# Patient Record
Sex: MALE | Race: WHITE | Employment: FULL TIME | URBAN - METROPOLITAN AREA
[De-identification: names, ages, dates, MRNs, and addresses within clinical notes are randomized per-mention and may not be internally consistent; named-entity substitution may affect disease eponyms.]

---

## 2021-04-17 ENCOUNTER — HOSPITAL ENCOUNTER (EMERGENCY)
Facility: CLINIC | Age: 51
Discharge: HOME | End: 2021-04-17
Attending: EMERGENCY MEDICINE | Admitting: EMERGENCY MEDICINE
Payer: COMMERCIAL

## 2021-04-17 ENCOUNTER — APPOINTMENT (OUTPATIENT)
Dept: GENERAL RADIOLOGY | Facility: CLINIC | Age: 51
End: 2021-04-17
Attending: EMERGENCY MEDICINE
Payer: COMMERCIAL

## 2021-04-17 VITALS
DIASTOLIC BLOOD PRESSURE: 112 MMHG | OXYGEN SATURATION: 100 % | SYSTOLIC BLOOD PRESSURE: 176 MMHG | HEART RATE: 119 BPM | TEMPERATURE: 97.9 F | RESPIRATION RATE: 16 BRPM

## 2021-04-17 DIAGNOSIS — S61.317A LACERATION OF LEFT LITTLE FINGER WITHOUT FOREIGN BODY WITH DAMAGE TO NAIL, INITIAL ENCOUNTER: ICD-10-CM

## 2021-04-17 DIAGNOSIS — T14.8XXA OPEN FRACTURE: ICD-10-CM

## 2021-04-17 PROCEDURE — 90715 TDAP VACCINE 7 YRS/> IM: CPT | Performed by: EMERGENCY MEDICINE

## 2021-04-17 PROCEDURE — 96365 THER/PROPH/DIAG IV INF INIT: CPT

## 2021-04-17 PROCEDURE — 250N000011 HC RX IP 250 OP 636: Performed by: EMERGENCY MEDICINE

## 2021-04-17 PROCEDURE — 90471 IMMUNIZATION ADMIN: CPT | Performed by: EMERGENCY MEDICINE

## 2021-04-17 PROCEDURE — 12001 RPR S/N/AX/GEN/TRNK 2.5CM/<: CPT

## 2021-04-17 PROCEDURE — 99284 EMERGENCY DEPT VISIT MOD MDM: CPT | Mod: 25

## 2021-04-17 PROCEDURE — 73140 X-RAY EXAM OF FINGER(S): CPT | Mod: LT

## 2021-04-17 PROCEDURE — 26750 TREAT FINGER FRACTURE EACH: CPT | Mod: T4

## 2021-04-17 RX ORDER — OXYCODONE HYDROCHLORIDE 5 MG/1
5 TABLET ORAL EVERY 6 HOURS PRN
Qty: 12 TABLET | Refills: 0 | Status: SHIPPED | OUTPATIENT
Start: 2021-04-17

## 2021-04-17 RX ORDER — CEPHALEXIN 500 MG/1
500 CAPSULE ORAL 4 TIMES DAILY
Qty: 40 CAPSULE | Refills: 0 | Status: SHIPPED | OUTPATIENT
Start: 2021-04-17 | End: 2021-04-27

## 2021-04-17 RX ORDER — SENNA AND DOCUSATE SODIUM 50; 8.6 MG/1; MG/1
1-2 TABLET, FILM COATED ORAL 2 TIMES DAILY PRN
Qty: 30 TABLET | Refills: 0 | Status: SHIPPED | OUTPATIENT
Start: 2021-04-17 | End: 2021-05-17

## 2021-04-17 RX ORDER — BUPIVACAINE HYDROCHLORIDE 5 MG/ML
INJECTION, SOLUTION PERINEURAL
Status: DISCONTINUED
Start: 2021-04-17 | End: 2021-04-17 | Stop reason: HOSPADM

## 2021-04-17 RX ORDER — CEFAZOLIN SODIUM 2 G/100ML
2 INJECTION, SOLUTION INTRAVENOUS ONCE
Status: COMPLETED | OUTPATIENT
Start: 2021-04-17 | End: 2021-04-17

## 2021-04-17 RX ADMIN — CLOSTRIDIUM TETANI TOXOID ANTIGEN (FORMALDEHYDE INACTIVATED), CORYNEBACTERIUM DIPHTHERIAE TOXOID ANTIGEN (FORMALDEHYDE INACTIVATED), BORDETELLA PERTUSSIS TOXOID ANTIGEN (GLUTARALDEHYDE INACTIVATED), BORDETELLA PERTUSSIS FILAMENTOUS HEMAGGLUTININ ANTIGEN (FORMALDEHYDE INACTIVATED), BORDETELLA PERTUSSIS PERTACTIN ANTIGEN, AND BORDETELLA PERTUSSIS FIMBRIAE 2/3 ANTIGEN 0.5 ML: 5; 2; 2.5; 5; 3; 5 INJECTION, SUSPENSION INTRAMUSCULAR at 13:31

## 2021-04-17 RX ADMIN — CEFAZOLIN SODIUM 2 G: 2 INJECTION, SOLUTION INTRAVENOUS at 14:09

## 2021-04-17 ASSESSMENT — ENCOUNTER SYMPTOMS
WOUND: 1
FEVER: 0
DIARRHEA: 0
COUGH: 0

## 2021-04-17 NOTE — ED PROVIDER NOTES
History   Chief Complaint:  Hand Pain     The history is provided by the patient.      Martell Tyler is a 50 year old, right handed, male who presents with nonradiating severe L 5th finger pain. The patient tells us that about thirty minutes before arrival to the ED he got his left 5th finger caught between a  and a piece of wood. The patient tells us the pain is non radiating. The patient denies fever, cough or diarrhea.     To note, the patients last Tetanus shot was in 2011.     Review of Systems   Constitutional: Negative for fever.   Respiratory: Negative for cough.    Gastrointestinal: Negative for diarrhea.   Skin: Positive for wound (Finger Laceration).   All other systems reviewed and are negative.    Allergies:  The patient has no known allergies.     Medications:  Aleve    Past Medical History:    Pain in thoracic spine  Spasm of back muscles  Cervicalgia     Past Surgical History:    The patient denies past surgical history.      Family History:    The patient denies past family history.     Social History:  The patient presents to the ED with his son.   The patient denies alcohol, tobacco or drug use.     Physical Exam     Patient Vitals for the past 24 hrs:   BP Temp Pulse Resp SpO2   04/17/21 1251 (!) 176/112 -- -- -- --   04/17/21 1249 -- 97.9  F (36.6  C) 119 16 100 %       Physical Exam  Constitutional: Well developed, nontox appearance  Head: Atraumatic.   Neck:  no stridor  Eyes: no scleral icterus  Cardiovascular: Reg tachycardia, 2+ L radial pulse  Pulmonary/Chest: nml resp effort  Ext: Warm, well perfused, no edema   LUE: complex 8 cm laceration to ulnar and palmar side of 5th finger, ROM limited   Neurological: A&O, symmetric facies, moves ext x4  Skin: Skin is warm and dry.   Psychiatric: Behavior is normal. Thought content normal.   Nursing note and vitals reviewed.    Emergency Department Course     Imaging:    XR Finger 2-3 Views Left:  ED read demonstrated comminuted  fracture of L 5th finger distal phalanx fracture     Procedures    Laceration Repair        LACERATION:  A complex clean 8 cm laceration.      LOCATION:  Left fifth finger.       FUNCTION:  Distally sensation, circulation, motor and tendon function are   intact.      ANESTHESIA:  Digital and flexor sheath blocks using Marcaine 0.5% total block 3 mLs      PREPARATION:  Irrigation and Scrubbing with Techni-Care and Shur Clens; betadine       DEBRIDEMENT:  Small amount debridement, wound explored, no foreign   body found.       CLOSURE:  Wound was closed with One Layer.  Skin closed with 19 x 5.0   Ethylon using interrupted sutures.    Emergency Department Course:    Reviewed:  I reviewed nursing notes, vitals, past medical history and care everywhere    Assessments:  1300 I obtained history and examined the patient as noted above.   1313 I rechecked the patient and numbed his wound at this time.   1435 I repaired the patients laceration at this time, see procedural note above.     Interventions:  1331 Tdap 0.5 mg IM  1409 Ancef 200 2 g IV    Disposition:  The patient was discharged to home.       Impression & Plan     Medical Decision Makin year old male presenting w/ L 5th finger laceration    The patient presented with a laceration and xrays demonstrated evidence of open fracture.  Abx ordered as noted above.  The wound was carefully evaluated and explored.  The laceration was closed as noted in the procedure note.  Given complex laceration and open fracture, plan for f/u with orthopedic hand surgery.  At this time I feel the pt is safe for discharge.  Recommendations given regarding follow up with [facility] and return to the emergency department as needed for new or worsening symptoms.  Pt counseled on disposition and diagnosis.  They are understanding and agreeable to plan. Patient discharged in stable condition.       Diagnosis:  No diagnosis found.    Discharge Medications:  Discharge  Medication List as of 4/17/2021  3:52 PM      START taking these medications    Details   cephALEXin (KEFLEX) 500 MG capsule Take 1 capsule (500 mg) by mouth 4 times daily for 10 days, Disp-40 capsule, R-0, E-Prescribe      oxyCODONE (ROXICODONE) 5 MG tablet Take 1 tablet (5 mg) by mouth every 6 hours as needed for pain, Disp-12 tablet, R-0, Local Print      SENNA-docusate sodium (SENNA S) 8.6-50 MG tablet Take 1-2 tablets by mouth 2 times daily as needed (if taking oxycodone), Disp-30 tablet, R-0, Local Print             Scribe Disclosure:  I, Brennan Conn, am serving as a scribe at 12:59 PM on 4/17/2021 to document services personally performed by Shar Molina MD, based on my observations and the provider's statements to me.            Shar Molina MD  04/17/21 1404

## 2021-04-17 NOTE — DISCHARGE INSTRUCTIONS
1. -Take acetaminophen 500 to 1000 mg by mouth every 4 to 6 hours as needed for pain or fever.  Do not take more than 4000 mg in 24 hours.  Do not take within 6 hours of another acetaminophen containing medication such as norco (vicodin) or percocet.  - Take ibuprofen 600 to 800 mg by mouth every 6 to 8 hours as needed for pain or fever  2. Use ice as needed for swelling.  3. Elevate extremity to help with swelling.  4. Follow-up with [facility]. There were 19 sutures placed.  5. You may return to the ED as needed for new or worsening symptoms such as fever greater than 100.4  F, reinjury, severe and uncontrollable pain, focal weakness, severe numbness and tingling, any other concerning symptoms.      Discharge Instructions  Laceration (Cut)    You were seen today for a laceration (cut).  Your provider examined your laceration for any problems such a buried foreign body (like glass, a splinter, or gravel), or injury to blood vessels, tendons, and nerves.  Your provider may have also rinsed and/or scrubbed your laceration to help prevent an infection. It may not be possible to find all problems with your laceration on the first visit; occasionally foreign bodies or a tendon injury can go undetected.    Your laceration may have been closed in one of several ways:  No closure: many wounds will heal just fine without closure.  Stitches: regular stitches that require removal.  Staples: skin staples are often used in the scalp/head.  Wound adhesive (glue): skin glue can be used for certain lacerations and doesn t require removal.  Wound strips (aka Butterfly bandages or steri-strips): these are bandages that help to close a wound.  Absorbable stitches:  dissolving  stitches that go away on their own and usually don t require removal.    A small percentage of wounds will develop an infection regardless of how well the wound is cared for. Antibiotics are generally not indicated to prevent an  infection so are only given for a small number of high-risk wounds. Some lacerations are too high risk to close, and are left open to heal because closure can increase the likelihood that an infection will develop.    Remember that all lacerations, no matter how expertly repaired, will cause scarring. We consider many factors, techniques, and materials, in our efforts to provide the best possible cosmetic outcome.    Generally, every Emergency Department visit should have a follow-up clinic visit with either a primary or a specialty clinic/provider. Please follow-up as instructed by your emergency provider today.     Return to the Emergency Department right away if:  You have more redness, swelling, pain, drainage (pus), a bad smell, or red streaking from your laceration as these symptoms could indicate an infection.  You have a fever of 100.4 F or more.  You have bleeding that you cannot stop at home. If your cut starts to bleed, hold pressure on the bleeding area with a clean cloth or put pressure over the bandage.  If the bleeding does not stop after using constant pressure for 30 minutes, you should return to the Emergency Department for further treatment.  An area past the laceration is cool, pale, or blue compared with the other side, or has a slower return of color when squeezed.  Your dressing seems too tight or starts to get uncomfortable or painful. For children, signs of a problem might be irritability or restlessness.  You have loss of normal function or use of an area, such as being unable to straighten or bend a finger normally.  You have a numb area past the laceration.    Return to the Emergency Department or see your regular provider if:  The laceration starts to come open.   You have something coming out of the cut or a feeling that there is something in the laceration.  Your wound will not heal, or keeps breaking open. There can always be glass, wood, dirt or other things in any wound.  They will  not always show up, even on x-rays.  If a wound does not heal, this may be why, and it is important to follow-up with your regular provider.    Home Care:  Take your dressing off in 12-24 hours, or as instructed by your provider, to check your laceration. Remove the dressing sooner if it seems too tight or painful, or if it is getting numb, tingly, or pale past the dressing.  Gently wash your laceration 1-2 times daily with clean water and mild soap. It is okay to shower or run clean water over the laceration, but do not let the laceration soak in water (no swimming).  If your laceration was closed with wound adhesive or strips: pat it dry and leave it open to the air. For all other repairs: after you wash your laceration, or at least 2 times a day, apply antibiotic ointment (such as Neosporin  or Bacitracin ) to the laceration, then cover it with a Band-Aid  or gauze.  Keep the laceration clean. Wear gloves or other protective clothing if you are around dirt.    Follow-up for removal:  If your wound was closed with staples or regular stitches, they need to be removed according to the instructions and timeline specified by your provider today.  If your wound was closed with absorbable ( dissolving ) sutures, they should fall out, dissolve, or not be visible in about one week. If they are still visible, then they should be removed according to the instructions and timeline specified by your provider today.    Scars:  To help minimize scarring:  Wear sunscreen over the healed laceration when out in the sun.  Massage the area regularly once healed.  You may apply Vitamin E to the healed wound.  Wait. Scars improve in appearance over months and years.    If you were given a prescription for medicine here today, be sure to read all of the information (including the package insert) that comes with your prescription.  This will include important information about the medicine, its side effects, and any warnings that you  need to know about.  The pharmacist who fills the prescription can provide more information and answer questions you may have about the medicine.  If you have questions or concerns that the pharmacist cannot address, please call or return to the Emergency Department.       Remember that you can always come back to the Emergency Department if you are not able to see your regular provider in the amount of time listed above, if you get any new symptoms, or if there is anything that worries you.

## 2021-04-17 NOTE — ED TRIAGE NOTES
Pt presents for a crush injury to the left 5th finger. Pt states he was using a wood splitter and got the finger caught between the metal and a piece of wood. Bleeding controlled, ABC intact, A&Ox4.

## 2021-09-25 ENCOUNTER — HEALTH MAINTENANCE LETTER (OUTPATIENT)
Age: 51
End: 2021-09-25

## 2023-01-07 ENCOUNTER — HEALTH MAINTENANCE LETTER (OUTPATIENT)
Age: 53
End: 2023-01-07

## 2024-02-10 ENCOUNTER — HEALTH MAINTENANCE LETTER (OUTPATIENT)
Age: 54
End: 2024-02-10